# Patient Record
Sex: FEMALE | Race: WHITE | NOT HISPANIC OR LATINO | Employment: FULL TIME | URBAN - METROPOLITAN AREA
[De-identification: names, ages, dates, MRNs, and addresses within clinical notes are randomized per-mention and may not be internally consistent; named-entity substitution may affect disease eponyms.]

---

## 2019-03-18 ENCOUNTER — HOSPITAL ENCOUNTER (OUTPATIENT)
Dept: RADIOLOGY | Facility: MEDICAL CENTER | Age: 29
End: 2019-03-18
Attending: CHIROPRACTOR
Payer: COMMERCIAL

## 2019-03-18 DIAGNOSIS — M99.04 SOMATIC DYSFUNCTION OF SACRAL REGION: ICD-10-CM

## 2019-03-18 DIAGNOSIS — M99.03 SOMATIC DYSFUNCTION OF LUMBAR REGION: ICD-10-CM

## 2019-03-18 DIAGNOSIS — M99.02 THORACIC SEGMENT DYSFUNCTION: ICD-10-CM

## 2019-03-18 DIAGNOSIS — M25.511 ACUTE PAIN OF RIGHT SHOULDER: ICD-10-CM

## 2019-03-18 DIAGNOSIS — M25.512 ACUTE PAIN OF LEFT SHOULDER: ICD-10-CM

## 2019-03-18 DIAGNOSIS — M99.01 CERVICAL SEGMENT DYSFUNCTION: ICD-10-CM

## 2019-03-18 PROCEDURE — 72100 X-RAY EXAM L-S SPINE 2/3 VWS: CPT

## 2019-03-18 PROCEDURE — 73030 X-RAY EXAM OF SHOULDER: CPT | Mod: RT

## 2019-03-18 PROCEDURE — 72040 X-RAY EXAM NECK SPINE 2-3 VW: CPT

## 2019-03-18 PROCEDURE — 72170 X-RAY EXAM OF PELVIS: CPT

## 2019-03-18 PROCEDURE — 72072 X-RAY EXAM THORAC SPINE 3VWS: CPT

## 2022-09-10 ENCOUNTER — HOSPITAL ENCOUNTER (OUTPATIENT)
Dept: LAB | Facility: MEDICAL CENTER | Age: 32
End: 2022-09-10
Attending: MIDWIFE
Payer: COMMERCIAL

## 2022-09-10 LAB — B-HCG SERPL-ACNC: ABNORMAL MIU/ML (ref 0–5)

## 2022-09-10 PROCEDURE — 36415 COLL VENOUS BLD VENIPUNCTURE: CPT

## 2022-09-10 PROCEDURE — 84702 CHORIONIC GONADOTROPIN TEST: CPT

## 2023-02-24 ENCOUNTER — HOSPITAL ENCOUNTER (OUTPATIENT)
Dept: LAB | Facility: MEDICAL CENTER | Age: 33
End: 2023-02-24
Attending: MIDWIFE
Payer: COMMERCIAL

## 2023-02-24 LAB
25(OH)D3 SERPL-MCNC: 74 NG/ML (ref 30–100)
GLUCOSE 1H P CHAL SERPL-MCNC: 171 MG/DL (ref 65–199)
GLUCOSE 2H P CHAL SERPL-MCNC: 128 MG/DL (ref 65–139)
GLUCOSE 3H P CHAL SERPL-MCNC: 94 MG/DL (ref 65–109)
GLUCOSE BS SERPL-MCNC: 76 MG/DL (ref 65–99)
T4 FREE SERPL-MCNC: 0.97 NG/DL (ref 0.93–1.7)
TSH SERPL DL<=0.005 MIU/L-ACNC: 2.25 UIU/ML (ref 0.38–5.33)

## 2023-02-24 PROCEDURE — 82306 VITAMIN D 25 HYDROXY: CPT

## 2023-02-24 PROCEDURE — 82952 GTT-ADDED SAMPLES: CPT

## 2023-02-24 PROCEDURE — 84439 ASSAY OF FREE THYROXINE: CPT

## 2023-02-24 PROCEDURE — 84443 ASSAY THYROID STIM HORMONE: CPT

## 2023-02-24 PROCEDURE — 36415 COLL VENOUS BLD VENIPUNCTURE: CPT

## 2023-02-24 PROCEDURE — 82951 GLUCOSE TOLERANCE TEST (GTT): CPT

## 2023-05-13 ENCOUNTER — HOSPITAL ENCOUNTER (INPATIENT)
Facility: MEDICAL CENTER | Age: 33
LOS: 2 days | End: 2023-05-15
Attending: OBSTETRICS & GYNECOLOGY | Admitting: OBSTETRICS & GYNECOLOGY
Payer: COMMERCIAL

## 2023-05-13 ENCOUNTER — ANESTHESIA EVENT (OUTPATIENT)
Dept: OBGYN | Facility: MEDICAL CENTER | Age: 33
End: 2023-05-13
Payer: COMMERCIAL

## 2023-05-13 ENCOUNTER — ANESTHESIA (OUTPATIENT)
Dept: OBGYN | Facility: MEDICAL CENTER | Age: 33
End: 2023-05-13
Payer: COMMERCIAL

## 2023-05-13 LAB
ABO + RH BLD: NORMAL
ABO GROUP BLD: NORMAL
BASOPHILS # BLD AUTO: 0.3 % (ref 0–1.8)
BASOPHILS # BLD: 0.07 K/UL (ref 0–0.12)
BLD GP AB SCN SERPL QL: NORMAL
EOSINOPHIL # BLD AUTO: 0.01 K/UL (ref 0–0.51)
EOSINOPHIL NFR BLD: 0 % (ref 0–6.9)
ERYTHROCYTE [DISTWIDTH] IN BLOOD BY AUTOMATED COUNT: 47.1 FL (ref 35.9–50)
HCT VFR BLD AUTO: 39.4 % (ref 37–47)
HGB BLD-MCNC: 14.2 G/DL (ref 12–16)
IMM GRANULOCYTES # BLD AUTO: 0.15 K/UL (ref 0–0.11)
IMM GRANULOCYTES NFR BLD AUTO: 0.6 % (ref 0–0.9)
LYMPHOCYTES # BLD AUTO: 1.55 K/UL (ref 1–4.8)
LYMPHOCYTES NFR BLD: 6.7 % (ref 22–41)
MCH RBC QN AUTO: 34.4 PG (ref 27–33)
MCHC RBC AUTO-ENTMCNC: 36 G/DL (ref 33.6–35)
MCV RBC AUTO: 95.4 FL (ref 81.4–97.8)
MONOCYTES # BLD AUTO: 1.04 K/UL (ref 0–0.85)
MONOCYTES NFR BLD AUTO: 4.5 % (ref 0–13.4)
NEUTROPHILS # BLD AUTO: 20.42 K/UL (ref 2–7.15)
NEUTROPHILS NFR BLD: 87.9 % (ref 44–72)
NRBC # BLD AUTO: 0 K/UL
NRBC BLD-RTO: 0 /100 WBC
PLATELET # BLD AUTO: 159 K/UL (ref 164–446)
PMV BLD AUTO: 10.5 FL (ref 9–12.9)
RBC # BLD AUTO: 4.13 M/UL (ref 4.2–5.4)
RH BLD: NORMAL
T PALLIDUM AB SER QL IA: NORMAL
WBC # BLD AUTO: 23.2 K/UL (ref 4.8–10.8)

## 2023-05-13 PROCEDURE — 700105 HCHG RX REV CODE 258: Performed by: NURSE PRACTITIONER

## 2023-05-13 PROCEDURE — 770002 HCHG ROOM/CARE - OB PRIVATE (112)

## 2023-05-13 PROCEDURE — 700111 HCHG RX REV CODE 636 W/ 250 OVERRIDE (IP)

## 2023-05-13 PROCEDURE — 700105 HCHG RX REV CODE 258: Performed by: ANESTHESIOLOGY

## 2023-05-13 PROCEDURE — 36415 COLL VENOUS BLD VENIPUNCTURE: CPT

## 2023-05-13 PROCEDURE — 86900 BLOOD TYPING SEROLOGIC ABO: CPT

## 2023-05-13 PROCEDURE — 700111 HCHG RX REV CODE 636 W/ 250 OVERRIDE (IP): Performed by: NURSE PRACTITIONER

## 2023-05-13 PROCEDURE — 700105 HCHG RX REV CODE 258: Performed by: OBSTETRICS & GYNECOLOGY

## 2023-05-13 PROCEDURE — 59514 CESAREAN DELIVERY ONLY: CPT | Performed by: OBSTETRICS & GYNECOLOGY

## 2023-05-13 PROCEDURE — 700111 HCHG RX REV CODE 636 W/ 250 OVERRIDE (IP): Performed by: OBSTETRICS & GYNECOLOGY

## 2023-05-13 PROCEDURE — 160002 HCHG RECOVERY MINUTES (STAT): Performed by: OBSTETRICS & GYNECOLOGY

## 2023-05-13 PROCEDURE — 160046 HCHG PACU - 1ST 60 MINS PHASE II: Performed by: OBSTETRICS & GYNECOLOGY

## 2023-05-13 PROCEDURE — 99140 ANES COMP EMERGENCY COND: CPT | Performed by: ANESTHESIOLOGY

## 2023-05-13 PROCEDURE — 700101 HCHG RX REV CODE 250: Performed by: ANESTHESIOLOGY

## 2023-05-13 PROCEDURE — 160047 HCHG PACU  - EA ADDL 30 MINS PHASE II: Performed by: OBSTETRICS & GYNECOLOGY

## 2023-05-13 PROCEDURE — 160035 HCHG PACU - 1ST 60 MINS PHASE I: Performed by: OBSTETRICS & GYNECOLOGY

## 2023-05-13 PROCEDURE — 160041 HCHG SURGERY MINUTES - EA ADDL 1 MIN LEVEL 4: Performed by: OBSTETRICS & GYNECOLOGY

## 2023-05-13 PROCEDURE — 59514 CESAREAN DELIVERY ONLY: CPT | Mod: AS | Performed by: NURSE PRACTITIONER

## 2023-05-13 PROCEDURE — 85025 COMPLETE CBC W/AUTO DIFF WBC: CPT

## 2023-05-13 PROCEDURE — A9270 NON-COVERED ITEM OR SERVICE: HCPCS

## 2023-05-13 PROCEDURE — 88307 TISSUE EXAM BY PATHOLOGIST: CPT

## 2023-05-13 PROCEDURE — 700111 HCHG RX REV CODE 636 W/ 250 OVERRIDE (IP): Performed by: STUDENT IN AN ORGANIZED HEALTH CARE EDUCATION/TRAINING PROGRAM

## 2023-05-13 PROCEDURE — 700102 HCHG RX REV CODE 250 W/ 637 OVERRIDE(OP)

## 2023-05-13 PROCEDURE — 86901 BLOOD TYPING SEROLOGIC RH(D): CPT

## 2023-05-13 PROCEDURE — 160009 HCHG ANES TIME/MIN: Performed by: OBSTETRICS & GYNECOLOGY

## 2023-05-13 PROCEDURE — C1755 CATHETER, INTRASPINAL: HCPCS | Performed by: OBSTETRICS & GYNECOLOGY

## 2023-05-13 PROCEDURE — A9270 NON-COVERED ITEM OR SERVICE: HCPCS | Performed by: OBSTETRICS & GYNECOLOGY

## 2023-05-13 PROCEDURE — 01961 ANES CESAREAN DELIVERY ONLY: CPT | Performed by: ANESTHESIOLOGY

## 2023-05-13 PROCEDURE — 86780 TREPONEMA PALLIDUM: CPT

## 2023-05-13 PROCEDURE — 86850 RBC ANTIBODY SCREEN: CPT

## 2023-05-13 PROCEDURE — 160025 RECOVERY II MINUTES (STATS): Performed by: OBSTETRICS & GYNECOLOGY

## 2023-05-13 PROCEDURE — 700111 HCHG RX REV CODE 636 W/ 250 OVERRIDE (IP): Performed by: ANESTHESIOLOGY

## 2023-05-13 PROCEDURE — 160048 HCHG OR STATISTICAL LEVEL 1-5: Performed by: OBSTETRICS & GYNECOLOGY

## 2023-05-13 PROCEDURE — 160029 HCHG SURGERY MINUTES - 1ST 30 MINS LEVEL 4: Performed by: OBSTETRICS & GYNECOLOGY

## 2023-05-13 PROCEDURE — 700102 HCHG RX REV CODE 250 W/ 637 OVERRIDE(OP): Performed by: OBSTETRICS & GYNECOLOGY

## 2023-05-13 RX ORDER — SODIUM CHLORIDE, SODIUM LACTATE, POTASSIUM CHLORIDE, CALCIUM CHLORIDE 600; 310; 30; 20 MG/100ML; MG/100ML; MG/100ML; MG/100ML
INJECTION, SOLUTION INTRAVENOUS CONTINUOUS
Status: DISCONTINUED | OUTPATIENT
Start: 2023-05-13 | End: 2023-05-13

## 2023-05-13 RX ORDER — IBUPROFEN 800 MG/1
800 TABLET ORAL EVERY 8 HOURS PRN
Status: DISCONTINUED | OUTPATIENT
Start: 2023-05-17 | End: 2023-05-15 | Stop reason: HOSPADM

## 2023-05-13 RX ORDER — IBUPROFEN 800 MG/1
800 TABLET ORAL EVERY 8 HOURS
Status: DISCONTINUED | OUTPATIENT
Start: 2023-05-14 | End: 2023-05-15 | Stop reason: HOSPADM

## 2023-05-13 RX ORDER — SODIUM CHLORIDE, SODIUM LACTATE, POTASSIUM CHLORIDE, CALCIUM CHLORIDE 600; 310; 30; 20 MG/100ML; MG/100ML; MG/100ML; MG/100ML
INJECTION, SOLUTION INTRAVENOUS ONCE
Status: ACTIVE | OUTPATIENT
Start: 2023-05-13 | End: 2023-05-14

## 2023-05-13 RX ORDER — ACETAMINOPHEN 500 MG
1000 TABLET ORAL EVERY 6 HOURS PRN
Status: DISCONTINUED | OUTPATIENT
Start: 2023-05-16 | End: 2023-05-15 | Stop reason: HOSPADM

## 2023-05-13 RX ORDER — OXYCODONE HYDROCHLORIDE 5 MG/1
5 TABLET ORAL EVERY 4 HOURS PRN
Status: DISCONTINUED | OUTPATIENT
Start: 2023-05-13 | End: 2023-05-15 | Stop reason: HOSPADM

## 2023-05-13 RX ORDER — SODIUM CHLORIDE, SODIUM LACTATE, POTASSIUM CHLORIDE, CALCIUM CHLORIDE 600; 310; 30; 20 MG/100ML; MG/100ML; MG/100ML; MG/100ML
INJECTION, SOLUTION INTRAVENOUS
Status: DISCONTINUED | OUTPATIENT
Start: 2023-05-13 | End: 2023-05-13 | Stop reason: SURG

## 2023-05-13 RX ORDER — AZITHROMYCIN 500 MG/5ML
500 INJECTION, POWDER, LYOPHILIZED, FOR SOLUTION INTRAVENOUS ONCE
Status: COMPLETED | OUTPATIENT
Start: 2023-05-13 | End: 2023-05-13

## 2023-05-13 RX ORDER — DIPHENHYDRAMINE HYDROCHLORIDE 50 MG/ML
12.5 INJECTION INTRAMUSCULAR; INTRAVENOUS
Status: DISCONTINUED | OUTPATIENT
Start: 2023-05-13 | End: 2023-05-13 | Stop reason: HOSPADM

## 2023-05-13 RX ORDER — SIMETHICONE 125 MG
125 TABLET,CHEWABLE ORAL 4 TIMES DAILY PRN
Status: DISCONTINUED | OUTPATIENT
Start: 2023-05-13 | End: 2023-05-15 | Stop reason: HOSPADM

## 2023-05-13 RX ORDER — KETOROLAC TROMETHAMINE 30 MG/ML
30 INJECTION, SOLUTION INTRAMUSCULAR; INTRAVENOUS ONCE
Status: DISCONTINUED | OUTPATIENT
Start: 2023-05-13 | End: 2023-05-13 | Stop reason: HOSPADM

## 2023-05-13 RX ORDER — SODIUM CHLORIDE, SODIUM LACTATE, POTASSIUM CHLORIDE, CALCIUM CHLORIDE 600; 310; 30; 20 MG/100ML; MG/100ML; MG/100ML; MG/100ML
INJECTION, SOLUTION INTRAVENOUS PRN
Status: DISCONTINUED | OUTPATIENT
Start: 2023-05-13 | End: 2023-05-15 | Stop reason: HOSPADM

## 2023-05-13 RX ORDER — VITAMIN A ACETATE, BETA CAROTENE, ASCORBIC ACID, CHOLECALCIFEROL, .ALPHA.-TOCOPHEROL ACETATE, DL-, THIAMINE MONONITRATE, RIBOFLAVIN, NIACINAMIDE, PYRIDOXINE HYDROCHLORIDE, FOLIC ACID, CYANOCOBALAMIN, CALCIUM CARBONATE, FERROUS FUMARATE, ZINC OXIDE, CUPRIC OXIDE 3080; 12; 120; 400; 1; 1.84; 3; 20; 22; 920; 25; 200; 27; 10; 2 [IU]/1; UG/1; MG/1; [IU]/1; MG/1; MG/1; MG/1; MG/1; MG/1; [IU]/1; MG/1; MG/1; MG/1; MG/1; MG/1
1 TABLET, FILM COATED ORAL
Status: DISCONTINUED | OUTPATIENT
Start: 2023-05-14 | End: 2023-05-15 | Stop reason: HOSPADM

## 2023-05-13 RX ORDER — CITRIC ACID/SODIUM CITRATE 334-500MG
30 SOLUTION, ORAL ORAL ONCE
Status: COMPLETED | OUTPATIENT
Start: 2023-05-13 | End: 2023-05-13

## 2023-05-13 RX ORDER — METHYLERGONOVINE MALEATE 0.2 MG/ML
INJECTION INTRAVENOUS PRN
Status: DISCONTINUED | OUTPATIENT
Start: 2023-05-13 | End: 2023-05-13 | Stop reason: SURG

## 2023-05-13 RX ORDER — METHYLERGONOVINE MALEATE 0.2 MG/ML
0.2 INJECTION INTRAVENOUS
Status: DISCONTINUED | OUTPATIENT
Start: 2023-05-13 | End: 2023-05-13 | Stop reason: HOSPADM

## 2023-05-13 RX ORDER — MEPERIDINE HYDROCHLORIDE 25 MG/ML
INJECTION INTRAMUSCULAR; INTRAVENOUS; SUBCUTANEOUS PRN
Status: DISCONTINUED | OUTPATIENT
Start: 2023-05-13 | End: 2023-05-13 | Stop reason: SURG

## 2023-05-13 RX ORDER — MISOPROSTOL 200 UG/1
800 TABLET ORAL
Status: DISCONTINUED | OUTPATIENT
Start: 2023-05-13 | End: 2023-05-13

## 2023-05-13 RX ORDER — ONDANSETRON 4 MG/1
4 TABLET, ORALLY DISINTEGRATING ORAL EVERY 6 HOURS PRN
Status: DISCONTINUED | OUTPATIENT
Start: 2023-05-13 | End: 2023-05-13

## 2023-05-13 RX ORDER — CEFAZOLIN SODIUM 1 G/3ML
2 INJECTION, POWDER, FOR SOLUTION INTRAMUSCULAR; INTRAVENOUS ONCE
Status: COMPLETED | OUTPATIENT
Start: 2023-05-13 | End: 2023-05-13

## 2023-05-13 RX ORDER — DIPHENHYDRAMINE HYDROCHLORIDE 50 MG/ML
25 INJECTION INTRAMUSCULAR; INTRAVENOUS EVERY 6 HOURS PRN
Status: DISCONTINUED | OUTPATIENT
Start: 2023-05-13 | End: 2023-05-15 | Stop reason: HOSPADM

## 2023-05-13 RX ORDER — PHENYLEPHRINE HYDROCHLORIDE 10 MG/ML
INJECTION, SOLUTION INTRAMUSCULAR; INTRAVENOUS; SUBCUTANEOUS PRN
Status: DISCONTINUED | OUTPATIENT
Start: 2023-05-13 | End: 2023-05-13 | Stop reason: SURG

## 2023-05-13 RX ORDER — ONDANSETRON 2 MG/ML
4 INJECTION INTRAMUSCULAR; INTRAVENOUS EVERY 6 HOURS PRN
Status: DISCONTINUED | OUTPATIENT
Start: 2023-05-13 | End: 2023-05-15 | Stop reason: HOSPADM

## 2023-05-13 RX ORDER — LIDOCAINE HYDROCHLORIDE 10 MG/ML
20 INJECTION, SOLUTION INFILTRATION; PERINEURAL
Status: DISCONTINUED | OUTPATIENT
Start: 2023-05-13 | End: 2023-05-13 | Stop reason: HOSPADM

## 2023-05-13 RX ORDER — KETOROLAC TROMETHAMINE 30 MG/ML
INJECTION, SOLUTION INTRAMUSCULAR; INTRAVENOUS
Status: ACTIVE
Start: 2023-05-13 | End: 2023-05-14

## 2023-05-13 RX ORDER — ALUMINA, MAGNESIA, AND SIMETHICONE 2400; 2400; 240 MG/30ML; MG/30ML; MG/30ML
30 SUSPENSION ORAL EVERY 6 HOURS PRN
Status: DISCONTINUED | OUTPATIENT
Start: 2023-05-13 | End: 2023-05-13 | Stop reason: HOSPADM

## 2023-05-13 RX ORDER — OXYTOCIN 10 [USP'U]/ML
10 INJECTION, SOLUTION INTRAMUSCULAR; INTRAVENOUS
Status: DISCONTINUED | OUTPATIENT
Start: 2023-05-13 | End: 2023-05-13

## 2023-05-13 RX ORDER — MORPHINE SULFATE 0.5 MG/ML
INJECTION, SOLUTION EPIDURAL; INTRATHECAL; INTRAVENOUS
Status: COMPLETED | OUTPATIENT
Start: 2023-05-13 | End: 2023-05-13

## 2023-05-13 RX ORDER — KETOROLAC TROMETHAMINE 30 MG/ML
30 INJECTION, SOLUTION INTRAMUSCULAR; INTRAVENOUS EVERY 8 HOURS PRN
Status: COMPLETED | OUTPATIENT
Start: 2023-05-13 | End: 2023-05-13

## 2023-05-13 RX ORDER — ONDANSETRON 4 MG/1
4 TABLET, ORALLY DISINTEGRATING ORAL EVERY 6 HOURS PRN
Status: DISCONTINUED | OUTPATIENT
Start: 2023-05-13 | End: 2023-05-15 | Stop reason: HOSPADM

## 2023-05-13 RX ORDER — OXYTOCIN 10 [USP'U]/ML
10 INJECTION, SOLUTION INTRAMUSCULAR; INTRAVENOUS
Status: DISCONTINUED | OUTPATIENT
Start: 2023-05-13 | End: 2023-05-13 | Stop reason: HOSPADM

## 2023-05-13 RX ORDER — MISOPROSTOL 200 UG/1
800 TABLET ORAL
Status: DISCONTINUED | OUTPATIENT
Start: 2023-05-13 | End: 2023-05-13 | Stop reason: HOSPADM

## 2023-05-13 RX ORDER — DIPHENHYDRAMINE HCL 25 MG
25 TABLET ORAL EVERY 6 HOURS PRN
Status: DISCONTINUED | OUTPATIENT
Start: 2023-05-13 | End: 2023-05-15 | Stop reason: HOSPADM

## 2023-05-13 RX ORDER — DOCUSATE SODIUM 100 MG/1
100 CAPSULE, LIQUID FILLED ORAL 2 TIMES DAILY PRN
Status: DISCONTINUED | OUTPATIENT
Start: 2023-05-13 | End: 2023-05-15 | Stop reason: HOSPADM

## 2023-05-13 RX ORDER — OXYCODONE HYDROCHLORIDE 10 MG/1
10 TABLET ORAL EVERY 4 HOURS PRN
Status: DISCONTINUED | OUTPATIENT
Start: 2023-05-13 | End: 2023-05-15 | Stop reason: HOSPADM

## 2023-05-13 RX ORDER — METHYLERGONOVINE MALEATE 0.2 MG/ML
INJECTION INTRAVENOUS
Status: COMPLETED
Start: 2023-05-13 | End: 2023-05-13

## 2023-05-13 RX ORDER — METOCLOPRAMIDE HYDROCHLORIDE 5 MG/ML
10 INJECTION INTRAMUSCULAR; INTRAVENOUS ONCE
Status: COMPLETED | OUTPATIENT
Start: 2023-05-13 | End: 2023-05-13

## 2023-05-13 RX ORDER — CALCIUM CARBONATE 500 MG/1
1000 TABLET, CHEWABLE ORAL EVERY 6 HOURS PRN
Status: DISCONTINUED | OUTPATIENT
Start: 2023-05-13 | End: 2023-05-15 | Stop reason: HOSPADM

## 2023-05-13 RX ORDER — ONDANSETRON 2 MG/ML
4 INJECTION INTRAMUSCULAR; INTRAVENOUS
Status: DISCONTINUED | OUTPATIENT
Start: 2023-05-13 | End: 2023-05-13 | Stop reason: HOSPADM

## 2023-05-13 RX ORDER — ACETAMINOPHEN 500 MG
1000 TABLET ORAL EVERY 6 HOURS
Status: DISCONTINUED | OUTPATIENT
Start: 2023-05-13 | End: 2023-05-15 | Stop reason: HOSPADM

## 2023-05-13 RX ORDER — LOPERAMIDE HYDROCHLORIDE 2 MG/1
2 CAPSULE ORAL 4 TIMES DAILY PRN
Status: DISCONTINUED | OUTPATIENT
Start: 2023-05-13 | End: 2023-05-13 | Stop reason: HOSPADM

## 2023-05-13 RX ORDER — ONDANSETRON 2 MG/ML
4 INJECTION INTRAMUSCULAR; INTRAVENOUS EVERY 6 HOURS PRN
Status: DISCONTINUED | OUTPATIENT
Start: 2023-05-13 | End: 2023-05-13

## 2023-05-13 RX ORDER — SODIUM CHLORIDE, SODIUM LACTATE, POTASSIUM CHLORIDE, CALCIUM CHLORIDE 600; 310; 30; 20 MG/100ML; MG/100ML; MG/100ML; MG/100ML
INJECTION, SOLUTION INTRAVENOUS ONCE
Status: DISCONTINUED | OUTPATIENT
Start: 2023-05-13 | End: 2023-05-13

## 2023-05-13 RX ORDER — KETOROLAC TROMETHAMINE 30 MG/ML
INJECTION, SOLUTION INTRAMUSCULAR; INTRAVENOUS
Status: COMPLETED
Start: 2023-05-13 | End: 2023-05-13

## 2023-05-13 RX ORDER — METHYLERGONOVINE MALEATE 0.2 MG/ML
0.2 INJECTION INTRAVENOUS
Status: DISCONTINUED | OUTPATIENT
Start: 2023-05-13 | End: 2023-05-13

## 2023-05-13 RX ORDER — MISOPROSTOL 200 UG/1
TABLET ORAL
Status: COMPLETED
Start: 2023-05-13 | End: 2023-05-13

## 2023-05-13 RX ORDER — OXYTOCIN 10 [USP'U]/ML
10 INJECTION, SOLUTION INTRAMUSCULAR; INTRAVENOUS
Status: DISCONTINUED | OUTPATIENT
Start: 2023-05-13 | End: 2023-05-15 | Stop reason: HOSPADM

## 2023-05-13 RX ORDER — TERBUTALINE SULFATE 1 MG/ML
0.25 INJECTION, SOLUTION SUBCUTANEOUS
Status: DISCONTINUED | OUTPATIENT
Start: 2023-05-13 | End: 2023-05-13 | Stop reason: HOSPADM

## 2023-05-13 RX ORDER — IBUPROFEN 800 MG/1
800 TABLET ORAL
Status: DISCONTINUED | OUTPATIENT
Start: 2023-05-13 | End: 2023-05-13 | Stop reason: HOSPADM

## 2023-05-13 RX ORDER — ACETAMINOPHEN 500 MG
1000 TABLET ORAL
Status: DISCONTINUED | OUTPATIENT
Start: 2023-05-13 | End: 2023-05-13 | Stop reason: HOSPADM

## 2023-05-13 RX ORDER — HALOPERIDOL 5 MG/ML
1 INJECTION INTRAMUSCULAR
Status: DISCONTINUED | OUTPATIENT
Start: 2023-05-13 | End: 2023-05-13 | Stop reason: HOSPADM

## 2023-05-13 RX ORDER — BUPIVACAINE HYDROCHLORIDE 7.5 MG/ML
INJECTION, SOLUTION INTRASPINAL
Status: COMPLETED | OUTPATIENT
Start: 2023-05-13 | End: 2023-05-13

## 2023-05-13 RX ORDER — SODIUM CHLORIDE, SODIUM GLUCONATE, SODIUM ACETATE, POTASSIUM CHLORIDE AND MAGNESIUM CHLORIDE 526; 502; 368; 37; 30 MG/100ML; MG/100ML; MG/100ML; MG/100ML; MG/100ML
1500 INJECTION, SOLUTION INTRAVENOUS ONCE
Status: COMPLETED | OUTPATIENT
Start: 2023-05-13 | End: 2023-05-13

## 2023-05-13 RX ORDER — CARBOPROST TROMETHAMINE 250 UG/ML
250 INJECTION, SOLUTION INTRAMUSCULAR
Status: DISCONTINUED | OUTPATIENT
Start: 2023-05-13 | End: 2023-05-13

## 2023-05-13 RX ADMIN — MEPERIDINE HYDROCHLORIDE 25 MG: 25 INJECTION INTRAMUSCULAR; INTRAVENOUS; SUBCUTANEOUS at 04:42

## 2023-05-13 RX ADMIN — SODIUM CHLORIDE, SODIUM GLUCONATE, SODIUM ACETATE, POTASSIUM CHLORIDE AND MAGNESIUM CHLORIDE 1500 ML: 526; 502; 368; 37; 30 INJECTION, SOLUTION INTRAVENOUS at 03:45

## 2023-05-13 RX ADMIN — ACETAMINOPHEN 1000 MG: 500 TABLET, FILM COATED ORAL at 17:12

## 2023-05-13 RX ADMIN — METHYLERGONOVINE MALEATE 200 MCG: 0.2 INJECTION, SOLUTION INTRAMUSCULAR; INTRAVENOUS at 04:32

## 2023-05-13 RX ADMIN — ACETAMINOPHEN 1000 MG: 500 TABLET, FILM COATED ORAL at 09:07

## 2023-05-13 RX ADMIN — DOCUSATE SODIUM 100 MG: 100 CAPSULE, LIQUID FILLED ORAL at 14:00

## 2023-05-13 RX ADMIN — SODIUM CHLORIDE, POTASSIUM CHLORIDE, SODIUM LACTATE AND CALCIUM CHLORIDE: 600; 310; 30; 20 INJECTION, SOLUTION INTRAVENOUS at 03:04

## 2023-05-13 RX ADMIN — KETOROLAC TROMETHAMINE 30 MG: 30 INJECTION, SOLUTION INTRAMUSCULAR; INTRAVENOUS at 22:54

## 2023-05-13 RX ADMIN — ACETAMINOPHEN 1000 MG: 500 TABLET, FILM COATED ORAL at 21:23

## 2023-05-13 RX ADMIN — SODIUM CITRATE AND CITRIC ACID MONOHYDRATE 30 ML: 500; 334 SOLUTION ORAL at 03:48

## 2023-05-13 RX ADMIN — CEFAZOLIN 2 G: 1 INJECTION, POWDER, FOR SOLUTION INTRAMUSCULAR; INTRAVENOUS at 04:07

## 2023-05-13 RX ADMIN — PHENYLEPHRINE HYDROCHLORIDE 100 MCG: 10 INJECTION INTRAVENOUS at 04:20

## 2023-05-13 RX ADMIN — OXYTOCIN 1000 ML: 10 INJECTION, SOLUTION INTRAMUSCULAR; INTRAVENOUS at 04:30

## 2023-05-13 RX ADMIN — PHENYLEPHRINE HYDROCHLORIDE 100 MCG: 10 INJECTION INTRAVENOUS at 04:39

## 2023-05-13 RX ADMIN — MISOPROSTOL 800 MCG: 200 TABLET ORAL at 04:45

## 2023-05-13 RX ADMIN — SODIUM CHLORIDE, POTASSIUM CHLORIDE, SODIUM LACTATE AND CALCIUM CHLORIDE: 600; 310; 30; 20 INJECTION, SOLUTION INTRAVENOUS at 03:45

## 2023-05-13 RX ADMIN — PHENYLEPHRINE HYDROCHLORIDE 100 MCG: 10 INJECTION INTRAVENOUS at 04:35

## 2023-05-13 RX ADMIN — FAMOTIDINE 20 MG: 10 INJECTION INTRAVENOUS at 03:48

## 2023-05-13 RX ADMIN — KETOROLAC TROMETHAMINE 30 MG: 30 INJECTION, SOLUTION INTRAMUSCULAR; INTRAVENOUS at 05:43

## 2023-05-13 RX ADMIN — AMPICILLIN SODIUM 1000 MG: 1 INJECTION, POWDER, FOR SOLUTION INTRAMUSCULAR; INTRAVENOUS at 03:06

## 2023-05-13 RX ADMIN — MORPHINE SULFATE 150 MCG: 0.5 INJECTION, SOLUTION EPIDURAL; INTRATHECAL; INTRAVENOUS at 04:05

## 2023-05-13 RX ADMIN — OXYTOCIN 125 ML/HR: 10 INJECTION, SOLUTION INTRAMUSCULAR; INTRAVENOUS at 05:18

## 2023-05-13 RX ADMIN — METOCLOPRAMIDE 10 MG: 5 INJECTION, SOLUTION INTRAMUSCULAR; INTRAVENOUS at 03:48

## 2023-05-13 RX ADMIN — SODIUM CHLORIDE, POTASSIUM CHLORIDE, SODIUM LACTATE AND CALCIUM CHLORIDE: 600; 310; 30; 20 INJECTION, SOLUTION INTRAVENOUS at 05:00

## 2023-05-13 RX ADMIN — KETOROLAC TROMETHAMINE 30 MG: 30 INJECTION, SOLUTION INTRAMUSCULAR; INTRAVENOUS at 14:00

## 2023-05-13 RX ADMIN — AZITHROMYCIN FOR INJECTION INJECTION, POWDER, LYOPHILIZED, FOR SOLUTION 500 MG: 500 INJECTION INTRAVENOUS at 03:45

## 2023-05-13 RX ADMIN — BUPIVACAINE HYDROCHLORIDE IN DEXTROSE 1.7 ML: 7.5 INJECTION, SOLUTION SUBARACHNOID at 04:05

## 2023-05-13 ASSESSMENT — PATIENT HEALTH QUESTIONNAIRE - PHQ9
2. FEELING DOWN, DEPRESSED, IRRITABLE, OR HOPELESS: NOT AT ALL
SUM OF ALL RESPONSES TO PHQ9 QUESTIONS 1 AND 2: 0
1. LITTLE INTEREST OR PLEASURE IN DOING THINGS: NOT AT ALL

## 2023-05-13 ASSESSMENT — PAIN DESCRIPTION - PAIN TYPE
TYPE: SURGICAL PAIN

## 2023-05-13 ASSESSMENT — LIFESTYLE VARIABLES: EVER_SMOKED: NEVER

## 2023-05-13 NOTE — ANESTHESIA PROCEDURE NOTES
Spinal Block    Date/Time: 5/13/2023 4:05 AM    Performed by: John Gomez M.D.  Authorized by: John Gomez M.D.    Start Time:  5/13/2023 4:05 AM  Reason for Block: primary anesthetic    patient identified, IV checked, site marked, risks and benefits discussed, surgical consent, monitors and equipment checked, pre-op evaluation and timeout performed    Patient Position:  Sitting  Prep: ChloraPrep, patient draped and sterile technique    Monitoring:  Blood pressure, continuous pulse oximetry and heart rate  Approach:  Midline  Location:  L3-4  Injection Technique:  Single-shot  Skin infiltration:  Lidocaine  Strength:  1%  Dose:  3ml  Needle Type:  Pencan  Needle Gauge:  25 G  CSF flowing pre/post injection:  Yes  Sensory Level:  T6

## 2023-05-13 NOTE — PROGRESS NOTES
OB attending note;    This patient is a 32-year-old female  1 para 0 at 40-5/7 weeks brought in by lay nurse midwife.  The patient has had prolonged rupture of membranes for over 3 days and has been at 8 cm since 11 AM (18 hours) she is GBS positive.  She has received ampicillin currently afebrile.  Fetal heart rate tracing shows minimal to no variability but no decelerations.  Patient arrived at approximately 0245 hrs. I have been counseling and talking to the patient her  and the lay nurse midwife and the patient is finally agree with my suggestion of immediate  section anesthesia has been notified as well as operating room and we are preparing to take her back emergently.  Preoperative counseling performed;  Discussed the risks of surgery to include risk of pain bleeding infection damage to any or all internal organs including but not limited to bowel intestines bladder uterus tubes ovaries nerves blood vessels skin and possible bruising to her baby.  Wound infection uterine infection was also discussed the risk of this is much higher due to prolonged rupture the membranes may be as high as 25 to 50%.  The risk of blood transfusion was also discussed to include risk of AIDS or hepatitis the risk of heavy blood loss during the surgery is at least 10 to 15% with a possible need for blood transfusion and they have agreed to blood transfusion as needed.  We have also discussed hysterectomy to control hemorrhage.  Hysterectomy means the patient will no longer be able to bear children.  All questions are answered in detail patient and her  were present.  They are giving ample opportunity to ask questions.  I answered all the questions to the best of my ability

## 2023-05-13 NOTE — CARE PLAN
The patient is Stable - Low risk of patient condition declining or worsening    Shift Goals  Clinical Goals: Lochia WDL, rest, pain control    Progress made toward(s) clinical / shift goals:    Problem: Knowledge Deficit - Standard  Goal: Patient and family/care givers will demonstrate understanding of plan of care, disease process/condition, diagnostic tests and medications  Outcome: Progressing     Problem: Skin Integrity  Goal: Skin integrity is maintained or improved  Outcome: Progressing     Problem: Knowledge Deficit -   Goal: Patient/support person will demonstrate understanding regarding  section  Outcome: Progressing     Problem: Pain  Goal: Patient's pain will be alleviated or reduced to the patient’s comfort goal  Outcome: Progressing     Problem: Risk for Infection and Impaired Wound Healing  Goal: Patient will remain free from infection  Outcome: Progressing  Goal: Patient's wound/surgical incision will decrease in size and heals properly  Outcome: Progressing     Problem: Risk for Venous Thromboembolism (VTE)  Goal: VTE prevention measures will be implemented and patient will remain free from VTE  Outcome: Progressing       Patient is not progressing towards the following goals:

## 2023-05-13 NOTE — OR SURGEON
Immediate Post OP Note    PreOp Diagnosis: Arrest of dilatation, nonreassuring fetal heart tracing, lady nurse midwife transport      PostOp Diagnosis: Same      Procedure(s):   SECTION, PRIMARY - Wound Class: Contaminated    Surgeon(s):  Perry Hollingsworth M.D.    Anesthesiologist/Type of Anesthesia:  Anesthesiologist: John Gomez M.D./Spinal    Surgical Staff:  Circulator: Idalmis Fritz R.N.  Scrub Person: Idalmis Mota  First Assist: Lise Sosa C.N.M.  Count [place]: Tessie Cassidy R.N.  L&BRAXTON Circulator Assistant: Katiuska Ross R.N.  L&BRAXTON Baby  Nurse: Sarina Villatoro R.N.    Specimens removed if any:  ID Type Source Tests Collected by Time Destination   A : routine histology Tissue Placenta PATHOLOGY SPECIMEN Perry Hollingsworth M.D. 2023  4:49 AM      Specimens-Cord gases, placenta cord and membranes  Estimated Blood Loss: 500 cc     Findings: Apgar scores 6 and 8, cord gas results pending normal pelvis meconium stained amniotic fluid nuchal cord x1 loose    Complications: None        2023 5:10 AM Perry Hollingsworth M.D.

## 2023-05-13 NOTE — ANESTHESIA PREPROCEDURE EVALUATION
Case: [id number] Date/Time: 23 0340    Procedure:  SECTION, PRIMARY (Abdomen)    Anesthesia type: Spinal    Pre-op diagnosis: arrest of dilation    Location: [facility] OR 02 / SURGERY LABOR AND DELIVERY    Surgeons: Perry Hollingsworth M.D.        Failure to progress  Relevant Problems   No relevant active problems       Physical Exam    Airway   Mallampati: II  TM distance: >3 FB  Neck ROM: full       Cardiovascular - normal exam  Rhythm: regular  Rate: normal  (-) murmur     Dental - normal exam           Pulmonary - normal exam  Breath sounds clear to auscultation     Abdominal    Neurological - normal exam                 Anesthesia Plan    ASA 2- EMERGENT   ASA physical status emergent criteria: non-reassuring fetal heart tones    Plan - spinal   Neuraxial block will be primary anesthetic                  Pertinent diagnostic labs and testing reviewed    Informed Consent:    Anesthetic plan and risks discussed with patient.

## 2023-05-13 NOTE — PROGRESS NOTES
0700: Report received from Zina LAIRD RN. Patient resting in [facility]. Infant in chest and FOB at bedside. VSS. Needs met at this time. Questions answered at this time.     0730: Per Jessica LY, OK to remove IV from L AC.     0810: Patient transferred to PP unit via [facility]. Infant in arms, FOB at side. Report to April RN. Bands verified.

## 2023-05-13 NOTE — H&P
"  History and Physical    Cherelle Morton is a 32 y.o. female  -Para:     Gestational Age:  40w5d  Admitted for:   Active Labor and home birth transfer  Admitted to  [facility].  Patient received prenatal care: [facility]    HPI: Patient is admitted with the above mentioned Chief Complaint and States   Loss of fluid:   Positive since 05/10 at 2145  Abdominal Pain:  negative  Uterine Contractions:  positive  Vaginal Bleeding:  negative  Fetal Movement:  normal  Patient denies fever, chills, nausea, vomiting , headache, visual disturbance, or dysuria  No LMP recorded. Patient is pregnant.  Estimated Date of Delivery: 23  Final SARA: 2023, by Patient Reported    Patient Active Problem List    Diagnosis Date Noted    Labor and delivery indication for care or intervention 2023       Admitting DX: Labor and delivery indication for care or intervention [O75.9]   Pregnancy Complications:  none  OB Risk Factors:   group B strep colonizer, prolonged ROM, arrest of dilation  Labor State:    Active phase labor., Prolonged active phase labor., Arrest of progress - first stage labor., and Dysfunctional uterine contractions.    History:   has no past medical history on file.     has no past surgical history on file.    OB History    Para Term  AB Living   1             SAB IAB Ectopic Molar Multiple Live Births                    # Outcome Date GA Lbr Justo/2nd Weight Sex Delivery Anes PTL Lv   1 Current                Medications:  No current facility-administered medications on file prior to encounter.     No current outpatient medications on file prior to encounter.       Allergies:  Patient has no known allergies.    ROS:   Neuro: negative    Cardiovascular: negative  Gastro intestinal: negative  Genitourinary: negative            Physical Exam:  /61   Pulse 98   Temp 36.6 °C (97.9 °F) (Tympanic)   Resp 20   Ht 1.575 m (5' 2\")   Wt 63.5 kg (140 lb)   SpO2 " 96%   BMI 25.61 kg/m²   Constitutional: healthy-appearing, Well-developed, well-nourished, in no acute distress  No JVD: while supine  HEENT: EOMI  Breast Exam: Inspection negative. No nipple discharge or bleeding. No masses or nodularity palpable  Cardio: regular rate and rhythm  Lung: unlabored respirations, no intercostal retractions or accessory muscle use, clear to auscultation without rales or wheezes  Abdomen: abdomen is soft without significant tenderness, masses, organomegaly or guarding  Extremity: extremities, peripheral pulses and reflexes normal, no edema, redness or tenderness in the calves or thighs, Homans sign is negative, no sign of DVT, feet normal, good pulses, normal color, temperature and sensation    Cervical Exam: Deferred  Pelvis: Adequate    Fetal Assessment: Fetal heart variability: moderate, minimal  Fetal Heart Rate decelerations: early  Fetal Heart Rate accelerations: yes  Baseline FHR: 135 per minute  Uterine contractions: irregular, every 4-5 minutes  Estimated Fetal Weight: 2500 - 3000g      Labs:      Prenatal Results       General (Most Recent Result)       Test Value Date Time    ABO       Rh       Antibody screen       HbA1c       Chlamydia by PCR       Gonorrhea by PCR       RPR/Syphilus       HSV 1/2 by PCR (non-serum)       HSV 1/2 (serum)       HSV 1       HSV 2       HPV (16)       HBsAg       HIV-1 HIV-2 Antibodies       Rubella       Tb                 Pap Smear (Most Recent Result)       Test Value Date Time    Pap smear       Pap smear w/HPV       Pap smear w/CTNG       Pap smar w/HPV CTNG       Pap smear (reflex HPV ACUS)       Pap smear (reflex HPV ASCUS w/CTNG)       Pathology gyn specimen                 Urinalysis (Most Recent Result)       Test Value Date Time    Urinalysis       POC urinalysis       Urine drug screen (w/ conf)       Urine culture ([id number])       Urine Protein/Creatinine Ratio                 Urinalysis, Culture if indicated       Test Value  Date Time    Color       Appearance       Specific Gravity       PH       Glucose       Ketones       Protein       Bilirubin       Nitrites       Leukocytes Esterase       Blood       Comment       Culture                 Urine Drug Screen       Test Value Date Time    Amphetamines       Barbiturates       Benzodiazepines       Cocaine       Methadone       Opiates       Oxycodone       Phencylidine       Propoxyphene       Marijuana Metabolite                 1st Trimester       Test Value Date Time    Hgb       Hct       Fasting Glucose Tolerance       GTT, 1 hour       GTT, 2 hours       GTT, 3 hours                 2nd Trimester       Test Value Date Time    Hgb       Hct       AST       ALT       Uric Acid       Fasting Glucose Tolerance       GTT, 1 hour       GTT, 2 hours       GTT, 3 hours                 3rd Trimester       Test Value Date Time    Hgb       Hct       Platelet count       GBS (SAGE BROTH)       Fasting Glucose Tolerance       GTT, 1 hour       GTT, 2 hous  128 mg/dL 23 0643    GTT, 3hours  94 mg/dL 23 0643              Congenital Disease Screening       Test Value Date Time    First Trimester Screen       Quad Screen       BH Electrophoresis       Cystic Fibrosis Carrier Study       SMA       AFP Maternal Serum        AFP Tetra       NIPT                 Legend    ^: Historical                              Assessment:  Gestational Age:  40w5d  Labor State:   Labor, Active  Risk Factors:   group B strep colonizer, prolonged ROM (5/10/2023 at 2145), arrest of dilation  Pregnancy Complications: Elevated 1 hour with normal 3 hour    Patient Active Problem List    Diagnosis Date Noted    Labor and delivery indication for care or intervention 2023       Plan:   Admitted for: Active Labor  Pain management  CBC, UA, and type/screen  routine urinalysis  Antibiotics: Prophylaxis for GBS- Continue ampicillin  IV meds or epidural as appropriate  Declines  or other interventions  at this time  Dr Hollingsworth is the attending today, and has met and discussed the case with the patient.    Lise Sosa C.N.M.

## 2023-05-13 NOTE — ANESTHESIA TIME REPORT
Anesthesia Start and Stop Event Times     Date Time Event    5/13/2023 0352 Ready for Procedure     0359 Anesthesia Start     0507 Anesthesia Stop        Responsible Staff  05/13/23    Name Role Begin End    John Gomez M.D. Anesth 0359 0507        Overtime Reason:  no overtime (within assigned shift)    Comments:

## 2023-05-13 NOTE — LACTATION NOTE
Initial Lactation Consultation:    Met with Cherelle and her new baby boy.  She reports breastfeeding to be going fairly well at this time. She states infant has already latched five or six times since delivery. She is concerned that baby's latch may be shallow, though, as she is beginning to notice some mild nipple tenderness with feeds.    Upon arrival of lactation consultant, infant has been at breast for 15 minutes. Infant still suckling, but beginning to slow. Latch appears shallow, so Cherelle is encouraged to break suction and re-latch. After infant released from breast, feeding cues unable to be elicited. Infant sleepy and disinterested in re-latching. Encouraged Cherelle to call RN/lactation for next feed to assist with achieving a deeper latch.     feeding and voiding/stooling patterns reviewed. Frequent skin-to-skin and cue-based feeding is encouraged; at least 8 feeds per 24 hours. Reviewed the milk making process, inclusive of supply and demand. Discussed signs of deep, asymmetric latch, and the importance of maintaining good latch to avoid pain/nipple damage and maximize milk transfer. Cherelle is to offer both breasts at each feeding, and baby should be allowed to self-limit time at breast. Nipple care reviewed. Discouraged introduction of artificial nipples during first 4 weeks, unless medically indicated.    Feeding plan:   Cue-based breast feeding, as above.    Cherelle is provided with the opportunity to ask questions. These have been answered to her satisfaction. She is encouraged to call RN/lactation for additional breastfeeding assistance, as needed, throughout remainder of hospital stay.       Cherelle plans to follow up with [facility], and has a home lactation visit scheduled for Monday.  [facility] Breastfeeding Resource list provided.

## 2023-05-13 NOTE — PROGRESS NOTES
0210- Pt is a  at 40.5 weeks gestation, presenting to L&D for prolonged at home labor and prolonged rupture of membranes since 5/10 at 2145. Pt has her SO, maximilian and midwife at bedside. Pt has been 8cm per midwife since about 1100 on  and has not progressed in dilation.  EFM/TOCO applied, POC discussed.   0224- ZIA Sosa CNM at bedside, SVE as charted and unchaged.  0252- Dr. Hollingsworth at bedside to discuss  section. Pt requesting time to discuss and decide.    0326- Pt agreeable for  section, Dr. Hollingsworth at bedside to consent pt.    0700- Bedside report given to Giselle SHEPPARD., POC discussed, care relinquished with pt in stable condition. Pt and SO deny any further needs from this RN.

## 2023-05-13 NOTE — ANESTHESIA POSTPROCEDURE EVALUATION
Patient: Cherelle Morton    Procedure Summary     Date: 23 Room / Location: [facility] OR 02 / SURGERY LABOR AND DELIVERY    Anesthesia Start: 359 Anesthesia Stop: 507    Procedure:  SECTION, PRIMARY (Bilateral: Abdomen) Diagnosis: (same, del)    Surgeons: Perry Hollingsworth M.D. Responsible Provider: John Gomez M.D.    Anesthesia Type: spinal ASA Status: 2 - Emergent          Final Anesthesia Type: spinal  Last vitals  BP   Blood Pressure: 106/57    Temp   36.2 °C (97.1 °F)    Pulse   86   Resp   20    SpO2   96 %      Anesthesia Post Evaluation    Patient location during evaluation: PACU  Patient participation: complete - patient participated  Level of consciousness: awake and alert    Airway patency: patent  Anesthetic complications: no  Cardiovascular status: hemodynamically stable  Respiratory status: acceptable  Hydration status: euvolemic    PONV: none          There were no known notable events for this encounter.     Nurse Pain Score: 4 (NPRS)

## 2023-05-13 NOTE — OP REPORT
DATE OF SERVICE:  2023     PREOPERATIVE DIAGNOSES:  Intrauterine pregnancy at 40 and 5/7th weeks with   arrest of dilatation and nonreassuring fetal heart rate tracing.     POSTOPERATIVE DIAGNOSES:  Intrauterine pregnancy at 40 and 5/7th weeks with   arrest of dilatation and nonreassuring fetal heart rate tracing.     SURGEON:  Perry Hollingsworth MD     ASSISTANT:  Lisa Sosa CNM.     PROCEDURE:  Primary low transverse uterine  section.     SPECIMENS:  Cord gases and placenta sent to pathology department.     ESTIMATED BLOOD LOSS:  500 mL.     DRAINS:  Rodríguez catheter.     ANESTHESIA:  Spinal.     ANESTHESIOLOGIST:  John Gomez MD     COMPLICATIONS:  None.     INDICATIONS:  This patient is a 32-year-old female  1, para 0 at 40 and   5/7th weeks.  She is a lay nurse midwife patient that had prolonged rupture   of membranes for 3-1/2 days and she has been 8 cm with no further cervical   change for approximately 18 hours.  She is group B strep positive.  The   patient was transported via [facility] to labor and delivery after a failed home   birth attempt.     FINDINGS:  Apgar score 6 and 8, cephalic presentation, thick meconium-stained   amniotic fluid.  Nuchal cord x1, loose.  Normal pelvis.  OT position.     DESCRIPTION OF PROCEDURE:  After adequately being counseled, the patient was   taken to the operating room and placed in supine position.  Spinal anesthetic   was placed.  Fetal heart tones were taken before and after placement of   spinal.  She was prepped and draped in the usual sterile fashion.  A   Pfannenstiel skin incision made with a scalpel, taken down to the fascia.  The   fascia was incised with a scalpel.  Fascial incision taken laterally on both   sides with Silva scissors.  Rectus fascia dissected off the underlying rectus   muscles both superiorly and inferiorly and rectus muscles were split in the   midline.  Peritoneal cavity was entered sharply by elevating the peritoneal    lining using Metzenbaum scissors.  Hemostats and the Metzenbaum scissors were   used to incise the peritoneal lining.  Incision was taken superiorly and   inferiorly.  Bladder blade placed over the bladder.  Next, a bladder flap was   developed sharply and a bladder blade replaced over the bladder.  Next, a low   transverse uterine incision was made with a scalpel.  The infant was   delivered, bulb suctioned.  Delayed cord clamping was performed.  The infant   was handed off to pediatrics, respiratory therapy and NICU were there to   resuscitate the infant.  Apgar scores 6 and 8.  Cord gas results pending.    Next, the placenta was removed from the uterus.  Uterine cavity was cleansed   with a moist laparotomy sponge.  Uterus was exteriorized and the uterine   incision was closed in 2 layers using 0 Vicryl in a running locked fashion.    Good hemostasis noted.  Uterus returned to abdominal pelvic cavity.  Pelvis   was irrigated and suctioned.  No bleeding was noted.  Excellent hemostasis   achieved in the hysterotomy site.  The peritoneal lining was closed using   running nonlocked stitch of 0 Vicryl.  Rectus muscles were reapproximated in   the midline using interrupted stitch of 0 chromic and the rectus fascia closed   using running nonlocked stitch of 0 Vicryl.  Subcutaneous tissues were   irrigated and suctioned.  Electrocautery used for hemostasis.  Several   subcuticular stitches of 0 Vicryl were used to reapproximate the subcutaneous   tissues.  Skin was closed using surgical staples and a pressure dressing was   applied.  The patient was taken to recovery room in good condition.  No   complications noted.  Instrument counts were correct.  Surgical timeout had   been performed prior to beginning the surgery.  The infant and mother were   taken to recovery room in excellent condition.        ______________________________  MD ASIM BRAN/GROVER    DD:  2023 05:17  DT:  2023  08:10    Job#:  [id number]

## 2023-05-13 NOTE — PROGRESS NOTES
0810-This RN receives report from Giselle SHEPPARD. Patient brought up to post partum via hospital bed.     1430-This RN gets patient up to br. Patient unable to void at this time. Pericare performed. Fresh pad applied. New gown applied. New pad on bed applied. Patient tolerates getting up well.     1910-Report given to post partum RN. Uncomplicated shift. Patient is stable with no needs or concerns at this time.

## 2023-05-14 LAB
ERYTHROCYTE [DISTWIDTH] IN BLOOD BY AUTOMATED COUNT: 50.7 FL (ref 35.9–50)
HCT VFR BLD AUTO: 35.6 % (ref 37–47)
HGB BLD-MCNC: 12.1 G/DL (ref 12–16)
MCH RBC QN AUTO: 34.2 PG (ref 27–33)
MCHC RBC AUTO-ENTMCNC: 34 G/DL (ref 33.6–35)
MCV RBC AUTO: 100.6 FL (ref 81.4–97.8)
PLATELET # BLD AUTO: 148 K/UL (ref 164–446)
PMV BLD AUTO: 10.2 FL (ref 9–12.9)
RBC # BLD AUTO: 3.54 M/UL (ref 4.2–5.4)
WBC # BLD AUTO: 13.4 K/UL (ref 4.8–10.8)

## 2023-05-14 PROCEDURE — A9270 NON-COVERED ITEM OR SERVICE: HCPCS | Performed by: OBSTETRICS & GYNECOLOGY

## 2023-05-14 PROCEDURE — 85027 COMPLETE CBC AUTOMATED: CPT

## 2023-05-14 PROCEDURE — 770002 HCHG ROOM/CARE - OB PRIVATE (112)

## 2023-05-14 PROCEDURE — 36415 COLL VENOUS BLD VENIPUNCTURE: CPT

## 2023-05-14 PROCEDURE — 700102 HCHG RX REV CODE 250 W/ 637 OVERRIDE(OP): Performed by: OBSTETRICS & GYNECOLOGY

## 2023-05-14 RX ADMIN — DOCUSATE SODIUM 100 MG: 100 CAPSULE, LIQUID FILLED ORAL at 20:16

## 2023-05-14 RX ADMIN — SIMETHICONE 125 MG: 125 TABLET, CHEWABLE ORAL at 22:13

## 2023-05-14 RX ADMIN — ACETAMINOPHEN 1000 MG: 500 TABLET, FILM COATED ORAL at 21:01

## 2023-05-14 RX ADMIN — OXYCODONE HYDROCHLORIDE 10 MG: 10 TABLET ORAL at 22:13

## 2023-05-14 RX ADMIN — DOCUSATE SODIUM 100 MG: 100 CAPSULE, LIQUID FILLED ORAL at 06:43

## 2023-05-14 RX ADMIN — IBUPROFEN 800 MG: 800 TABLET, FILM COATED ORAL at 22:13

## 2023-05-14 RX ADMIN — PRENATAL WITH FERROUS FUM AND FOLIC ACID 1 TABLET: 3080; 920; 120; 400; 22; 1.84; 3; 20; 10; 1; 12; 200; 27; 25; 2 TABLET ORAL at 08:45

## 2023-05-14 RX ADMIN — IBUPROFEN 800 MG: 800 TABLET, FILM COATED ORAL at 11:41

## 2023-05-14 RX ADMIN — ACETAMINOPHEN 1000 MG: 500 TABLET, FILM COATED ORAL at 08:45

## 2023-05-14 RX ADMIN — ACETAMINOPHEN 1000 MG: 500 TABLET, FILM COATED ORAL at 14:47

## 2023-05-14 RX ADMIN — ACETAMINOPHEN 1000 MG: 500 TABLET, FILM COATED ORAL at 03:05

## 2023-05-14 ASSESSMENT — EDINBURGH POSTNATAL DEPRESSION SCALE (EPDS)
I HAVE FELT SCARED OR PANICKY FOR NO GOOD REASON: NO, NOT MUCH
THE THOUGHT OF HARMING MYSELF HAS OCCURRED TO ME: NEVER
I HAVE BLAMED MYSELF UNNECESSARILY WHEN THINGS WENT WRONG: YES, SOME OF THE TIME
I HAVE FELT SAD OR MISERABLE: YES, QUITE OFTEN
THINGS HAVE BEEN GETTING ON TOP OF ME: YES, SOMETIMES I HAVEN'T BEEN COPING AS WELL AS USUAL
I HAVE BEEN ANXIOUS OR WORRIED FOR NO GOOD REASON: YES, SOMETIMES
I HAVE BEEN SO UNHAPPY THAT I HAVE HAD DIFFICULTY SLEEPING: NOT VERY OFTEN
I HAVE BEEN SO UNHAPPY THAT I HAVE BEEN CRYING: ONLY OCCASIONALLY
I HAVE BEEN ABLE TO LAUGH AND SEE THE FUNNY SIDE OF THINGS: AS MUCH AS I ALWAYS COULD
I HAVE LOOKED FORWARD WITH ENJOYMENT TO THINGS: AS MUCH AS I EVER DID

## 2023-05-14 ASSESSMENT — PAIN DESCRIPTION - PAIN TYPE
TYPE: SURGICAL PAIN
TYPE: SURGICAL PAIN
TYPE: ACUTE PAIN;SURGICAL PAIN
TYPE: SURGICAL PAIN
TYPE: ACUTE PAIN;SURGICAL PAIN

## 2023-05-14 NOTE — CARE PLAN
Problem: Altered Physiologic Condition  Goal: Patient physiologically stable as evidenced by normal lochia, palpable uterine involution and vitals within normal limits  Outcome: Progressing     Problem: Bowel Elimination - Post Surgical  Goal: Patient will resume regular bowel sounds and function with no discomfort or distention  Outcome: Progressing   The patient is Stable - Low risk of patient condition declining or worsening pt is hemodynamically stable    Shift Goals: pain controlled, ambulation, rest  Clinical Goals: pain management  Patient Goals: pain controlled, able to void without difficulty, ambulation, rest  Family Goals: support    Progress made toward(s) clinical / shift goals:  pt verbalized acceptable level of pain    Patient is not progressing towards the following goals:

## 2023-05-14 NOTE — CARE PLAN
The patient is Stable - Low risk of patient condition declining or worsening    Shift Goals  Clinical Goals: pain management, maintain stable vitals    Problem: Infection - Postpartum  Goal: Postpartum patient will be free of signs and symptoms of infection  Outcome: Progressing  Note: Patient vitals stable. No signs/symptoms infection noted or reported.     Problem: Respiratory/Oxygenation Function Post-Surgical  Goal: Patient will achieve/maintain normal respiratory rate/effort  Outcome: Progressing  Note: Patient vitals stable. No signs or symptoms respiratory distress.

## 2023-05-14 NOTE — PROGRESS NOTES
Obstetrics & Gynecology Post-Delivery Progress Note    Date of Service      32 y.o.  s/p  for arrest of dilatation and nonreassuring fetal heart rate tracing.   Delivery date: 23      Subjective  Pt reports she is feeling well. Pain is 4-5/10 with movement, otherwise well controlled. She is attempting to breast feed.     Pain: Well controlled  Bleeding: lochia minimal  Tolerating PO: yes  Voiding: without difficulty  Ambulating: yes  Passing flatus: Yes  Feeding: breastfeeding well    Objective  24hr VS:  Temp:  [36.2 °C (97.1 °F)-36.8 °C (98.3 °F)] 36.4 °C (97.6 °F)  Pulse:  [73-91] 73  Resp:  [15-18] 18  BP: ()/(57-68) 100/68  SpO2:  [95 %-98 %] 96 %    Physical Exam  Gen: well appearing, no apparent distress, resting comfortably in bed  CV: rrr, no m/r/g  Resp: CTAB, symmetric breath sounds  Abd: soft, nontender, nondistended  Fundus: firm  Incision: Incision site is clean, dry, and intact with staples in place   Ext: symmetric, no peripheral edema, calves nontender    Labs:  Lab Results   Component Value Date/Time    WBC 13.4 (H) 2023 02:47 AM    RBC 3.54 (L) 2023 02:47 AM    HEMOGLOBIN 12.1 2023 02:47 AM    HEMATOCRIT 35.6 (L) 2023 02:47 AM    .6 (H) 2023 02:47 AM    MCH 34.2 (H) 2023 02:47 AM    MCHC 34.0 2023 02:47 AM    MPV 10.2 2023 02:47 AM    NEUTSPOLYS 87.90 (H) 2023 03:07 AM    LYMPHOCYTES 6.70 (L) 2023 03:07 AM    MONOCYTES 4.50 2023 03:07 AM    EOSINOPHILS 0.00 2023 03:07 AM    BASOPHILS 0.30 2023 03:07 AM       No results found for: SODIUM, POTASSIUM, CHLORIDE, CO2, GLUCOSE, BUN, CREATININE, BUNCREATRAT, GLOMRATE      Medications  ibuprofen, 800 mg, Oral, Q8HRS  acetaminophen, 1,000 mg, Oral, Q6HRS  LR, , Intravenous, Once  oxytocin, 20 Units, Intravenous, Once  prenatal plus vitamin, 1 Tablet, Oral, Daily-0800      PRN medications: LR, ibuprofen **FOLLOWED BY** [START ON 2023]  ibuprofen, acetaminophen **FOLLOWED BY** [START ON 2023] acetaminophen, oxyCODONE immediate-release, oxyCODONE immediate release, ondansetron **OR** ondansetron, diphenhydrAMINE **OR** diphenhydrAMINE, docusate sodium, tetanus-dipth-acell pertussis, measles, mumps and rubella vaccine, oxytocin, simethicone, calcium carbonate      Assessment/Plan  Cherelle Morton is a 32 y.o.yo  postpartum day #1  s/p  for arrest of dilatation and nonreassuring fetal heart rate tracing.     - Post care: meeting all goals  - Pain: controlled  - Rh+, Rubella Immune  - Method of Feeding: plans to breastfeed    VTE prophylaxis: none indicated    - Disposition: likely home PPD 2- 3      Cherelle Galvez M.D. PGY-1

## 2023-05-14 NOTE — PROGRESS NOTES
1930 Received awake on bed bonding with baby, Assessment done fundus firm with scant lochia, abdomen soft non distended, wound dressing clean dry and intact, negative for Karen signs, up to bathroom and voiding without difficulty, plan of care discussed, denies pain at this time, Encourage more ambulation, Needs attended.

## 2023-05-14 NOTE — LACTATION NOTE
This note was copied from a baby's chart.  Follow-up LC visit, parents feel breastfeeding is going well overall, mother's nipples are feeling sensitive but she is able to achieve a comfortable latch, reviewed positioning techniques at breast to facilitate deeper latch, see LC flow sheet for more details. Reviewed hunger cues, cluster feeding, milk onset, and nipple care. Plan to continue cue based breastfeeding at least 8 or more times per 24 hours. Parents deny questions/concerns.

## 2023-05-14 NOTE — PROGRESS NOTES
Assumed care. Assessment completed. Fundus firm, lochia light. Abdominal incision with staples intact. No drainage. Plan of Care reviewed. Will call if pain intervention needed.

## 2023-05-15 ENCOUNTER — PHARMACY VISIT (OUTPATIENT)
Dept: PHARMACY | Facility: MEDICAL CENTER | Age: 33
End: 2023-05-15
Payer: COMMERCIAL

## 2023-05-15 VITALS
RESPIRATION RATE: 18 BRPM | SYSTOLIC BLOOD PRESSURE: 109 MMHG | BODY MASS INDEX: 25.76 KG/M2 | TEMPERATURE: 97 F | HEIGHT: 62 IN | HEART RATE: 65 BPM | WEIGHT: 140 LBS | DIASTOLIC BLOOD PRESSURE: 62 MMHG | OXYGEN SATURATION: 97 %

## 2023-05-15 LAB — PATHOLOGY CONSULT NOTE: NORMAL

## 2023-05-15 PROCEDURE — RXMED WILLOW AMBULATORY MEDICATION CHARGE

## 2023-05-15 PROCEDURE — 700102 HCHG RX REV CODE 250 W/ 637 OVERRIDE(OP): Performed by: OBSTETRICS & GYNECOLOGY

## 2023-05-15 PROCEDURE — A9270 NON-COVERED ITEM OR SERVICE: HCPCS | Performed by: OBSTETRICS & GYNECOLOGY

## 2023-05-15 RX ORDER — IBUPROFEN 800 MG/1
800 TABLET ORAL EVERY 8 HOURS PRN
Qty: 30 TABLET | Refills: 0 | Status: SHIPPED | OUTPATIENT
Start: 2023-05-17

## 2023-05-15 RX ORDER — ACETAMINOPHEN 500 MG
1000 TABLET ORAL EVERY 6 HOURS
Qty: 30 TABLET | Refills: 0 | Status: SHIPPED | OUTPATIENT
Start: 2023-05-15

## 2023-05-15 RX ORDER — HYDROCODONE BITARTRATE AND ACETAMINOPHEN 5; 325 MG/1; MG/1
1 TABLET ORAL EVERY 4 HOURS PRN
Qty: 20 TABLET | Refills: 0 | Status: SHIPPED | OUTPATIENT
Start: 2023-05-15 | End: 2023-05-22

## 2023-05-15 RX ORDER — OXYCODONE HYDROCHLORIDE 5 MG/1
5 TABLET ORAL EVERY 4 HOURS PRN
Qty: 20 TABLET | Refills: 0 | Status: SHIPPED | OUTPATIENT
Start: 2023-05-15 | End: 2023-05-15

## 2023-05-15 RX ORDER — PSEUDOEPHEDRINE HCL 30 MG
100 TABLET ORAL 2 TIMES DAILY PRN
Qty: 60 CAPSULE | Refills: 0 | Status: SHIPPED | OUTPATIENT
Start: 2023-05-15

## 2023-05-15 RX ADMIN — ACETAMINOPHEN 1000 MG: 500 TABLET, FILM COATED ORAL at 09:08

## 2023-05-15 RX ADMIN — DOCUSATE SODIUM 100 MG: 100 CAPSULE, LIQUID FILLED ORAL at 08:03

## 2023-05-15 RX ADMIN — PRENATAL WITH FERROUS FUM AND FOLIC ACID 1 TABLET: 3080; 920; 120; 400; 22; 1.84; 3; 20; 10; 1; 12; 200; 27; 25; 2 TABLET ORAL at 08:03

## 2023-05-15 RX ADMIN — ACETAMINOPHEN 1000 MG: 500 TABLET, FILM COATED ORAL at 02:49

## 2023-05-15 RX ADMIN — IBUPROFEN 800 MG: 800 TABLET, FILM COATED ORAL at 08:02

## 2023-05-15 RX ADMIN — IBUPROFEN 800 MG: 800 TABLET, FILM COATED ORAL at 13:47

## 2023-05-15 ASSESSMENT — PAIN DESCRIPTION - PAIN TYPE
TYPE: SURGICAL PAIN

## 2023-05-15 NOTE — DISCHARGE PLANNING
Discharge Planning Assessment Post Partum    Reason for Referral: Hx of THC use prior to pregnancy  Address: [address] [place] [address] [place] [address]  Type of Living Situation: POB stated they lived together  Mom Diagnosis: Post partum  Baby Diagnosis: 40w5d  Primary Language: English    Name of Baby: Daniel Strauss  Father of the Baby: Mason Strauss  Involved in baby’s care? Yes  Contact Information: [phone]    Prenatal Care: Yes  Mom's PCP: None  PCP for new baby: Dr. Jonha Villalpando    Support System: Yes  Coping/Bonding between mother & baby: Yes  Source of Feeding: Breast, has a pump  Supplies for Infant: MOB states they have supplies    Mom's Insurance: [employer]  Baby Covered on Insurance:Yes  Mother Employed/School:   Other children in the home/names & ages: First baby    Financial Hardship/Income: None identified  Mom's Mental status: Stable and Alert  Services used prior to admit: None    CPS History: None  Psychiatric History: None identified  Domestic Violence History: None   Drug/ETOH History: Hx of THC use prior to pregnancy      Resources Provided: Post partum depression list was given  Referrals Made: None     Clearance for Discharge: Baby is ready for discharge when MOB and FOB are medically cleared

## 2023-05-15 NOTE — PROGRESS NOTES
Assessment done. Vital signs stable. Patient voiding without difficulty, claims to be passing flatus. Fundus firm at umbilicus. open to air over low abdominal staples clean, dry, and intact.  Patient ambulating with steady gait. Patient claims to have good pain relief with p.o meds. Breast feeding infant on demand. Family at bedside. Patient claims she will call for medications or any needs. Will continue to monitor.

## 2023-05-15 NOTE — LACTATION NOTE
This note was copied from a baby's chart.  Follow-up visit, mother's breasts beginning to fill with milk, reports some challenges getting a comfortable latch with almaguer breasts this morning. Discussed techniques to soften areola prior to latch including hand expressing and reverse pressure softening. Reviewed milk onset and prevention of engorgement. Encouraged to call for next feeding for RN and/or LC assist and assessment. Mother considering discharge home later today, planning to have a home visit from her outpatient lactation consultant tomorrow for follow-up support. Plan to continue cue based breastfeeding at least 8 or more times per 24 hours. Mother denies questions/concerns.

## 2023-05-15 NOTE — DISCHARGE SUMMARY
Discharge Summary:      Cherelle Morton    Admit Date:   2023  Discharge Date:  5/15/2023     Admitting diagnosis:  Labor and delivery indication for care or intervention [O75.9]  Labor and delivery, indication for care [O75.9]  Discharge Diagnosis: Status post  for first stage arrest, prolonged ROM.  Pregnancy Complications: none  Tubal Ligation:  no        History:  History reviewed. No pertinent past medical history.  OB History    Para Term  AB Living   1 1 1     1   SAB IAB Ectopic Molar Multiple Live Births           0 1      # Outcome Date GA Lbr Justo/2nd Weight Sex Delivery Anes PTL Lv   1 Term 23 40w5d  3.23 kg (7 lb 1.9 oz) M CS-LTranv Spinal N JAYCEE      Complications: Dysfunctional Labor, Prolonged rupture of membranes        Patient has no known allergies.  Patient Active Problem List    Diagnosis Date Noted    Postpartum care and examination immediately after delivery 05/15/2023    Labor and delivery indication for care or intervention 2023    Labor and delivery, indication for care 2023        Hospital Course:   32 y.o. , now para 1, was admitted with the above mentioned diagnosis, underwent Primary  first stage arrest, prolonged ROM . Patient postpartum course was unremarkable, with progressive advancement in diet , ambulation and toleration of oral analgesia. Patient without complaints today and desires discharge.  She reports she may desire to stay another night depending on how the morning goes, but will let the RN staff know if d/c order needs to be cancelled. Aware she will need to follow up in the office for staple removal on Friday if she leaves today, we could removal staples on POD#3 if she stays overnight.     Vitals:    23 2200 23 0200 23 0600 23 1738   BP: 98/64 100/68 101/61 102/58   Pulse: 74 73 63 75   Resp: 18 18 17 17   Temp: 36.8 °C (98.3 °F) 36.4 °C (97.6 °F) 36.4 °C (97.5 °F) 37 °C (98.6 °F)    TempSrc: Temporal Temporal Temporal Temporal   SpO2: 96% 96% 96% 98%   Weight:       Height:           Current Facility-Administered Medications   Medication Dose    lactated ringers infusion      ibuprofen (MOTRIN) tablet 800 mg  800 mg    Followed by    [START ON 5/17/2023] ibuprofen (MOTRIN) tablet 800 mg  800 mg    acetaminophen (TYLENOL) tablet 1,000 mg  1,000 mg    Followed by    [START ON 5/16/2023] acetaminophen (TYLENOL) tablet 1,000 mg  1,000 mg    oxyCODONE immediate-release (ROXICODONE) tablet 5 mg  5 mg    oxyCODONE immediate release (ROXICODONE) tablet 10 mg  10 mg    ondansetron (ZOFRAN) syringe/vial injection 4 mg  4 mg    Or    ondansetron (ZOFRAN ODT) dispertab 4 mg  4 mg    diphenhydrAMINE (BENADRYL) tablet/capsule 25 mg  25 mg    Or    diphenhydrAMINE (BENADRYL) injection 25 mg  25 mg    docusate sodium (COLACE) capsule 100 mg  100 mg    tetanus-dipth-acell pertussis (Tdap) inj 0.5 mL  0.5 mL    measles, mumps and rubella vaccine (MMR) injection 0.5 mL  0.5 mL    oxytocin (Pitocin) infusion (for post delivery)  125 mL/hr    oxytocin (PITOCIN) injection 10 Units  10 Units    prenatal plus vitamin (STUARTNATAL 1+1) 27-1 MG tablet 1 Tablet  1 Tablet    simethicone (Mylicon) chewable tablet 125 mg  125 mg    calcium carbonate (Tums) chewable tab 1,000 mg  1,000 mg       Exam:  Breast Exam: Inspection negative. No nipple discharge or bleeding. No masses or nodularity palpable  Abdomen: Abdomen soft, non-tender. BS normal. No masses,  No organomegaly  Fundus Non Tender: yes  Incision: dry and intact, healing well with good reapproximation, OMARI, staples in place  Perineum: perineum intact  Extremity: extremities, peripheral pulses and reflexes normal     Labs:  Recent Labs     05/13/23  0307 05/14/23  0247   WBC 23.2* 13.4*   RBC 4.13* 3.54*   HEMOGLOBIN 14.2 12.1   HEMATOCRIT 39.4 35.6*   MCV 95.4 100.6*   MCH 34.4* 34.2*   MCHC 36.0* 34.0   RDW 47.1 50.7*   PLATELETCT 159* 148*   MPV 10.5 10.2         Activity:   Discharge to home  Pelvic Rest x 6 weeks    Assessment:  normal postpartum course  BCM: desires natural family planning  Discharge Assessment: Taking adequate diet and fluids, no heavy bleeding or foul discharge, breasts without evidence of infection/concerns, voiding without difficulty     Follow up: [facility] on Friday 5/19 for incision check and suture removal; 5 weeks for postpartum visit      Discharge Meds:   Current Outpatient Medications   Medication Sig Dispense Refill    acetaminophen (TYLENOL) 500 MG Tab Take 2 Tablets by mouth every 6 hours. 30 Tablet 0    docusate sodium 100 MG Cap Take 100 mg by mouth 2 times a day as needed for Constipation. 60 Capsule 0    [START ON 5/17/2023] ibuprofen (MOTRIN) 800 MG Tab Take 1 Tablet by mouth every 8 hours as needed for Mild Pain or Moderate Pain. 30 Tablet 0    oxyCODONE immediate-release (ROXICODONE) 5 MG Tab Take 1 Tablet by mouth every four hours as needed for Severe Pain for up to 7 days. 20 Tablet 0       Melissa Stoll C.N.M.  Attending: Dr. Sanchez

## 2023-05-15 NOTE — LACTATION NOTE
This note was copied from a baby's chart.  Follow-up visit for breastfeeding assistance. Reviewed football and cross cradle positioning and nose to nipple angle of latch. Able to achieve deep and comfortable latch using cross cradle hold. Reviewed hand expression technique, drops easily removed.

## 2023-05-15 NOTE — PROGRESS NOTES
2100 Pt doing well bonding with baby, Assessment done fundus firm with scant lochia, abdomen soft non distended, wound open to air staples to skin clean and dry, Pt up to bathroom and voiding without difficulty, Scheduled medicine given, Encourage more ambulation, Needs attended.

## 2023-05-15 NOTE — CARE PLAN
Problem: Altered Physiologic Condition  Goal: Patient physiologically stable as evidenced by normal lochia, palpable uterine involution and vitals within normal limits  Outcome: Progressing     Problem: Bowel Elimination - Post Surgical  Goal: Patient will resume regular bowel sounds and function with no discomfort or distention  Outcome: Progressing   The patient is Stable - Low risk of patient condition declining or worsening pt is hemodynamically stable    Shift Goals: pain controled, breastfeed, rest  Clinical Goals: pain management  Patient Goals: pain controlled, breastfeed, rest  Family Goals: support    Progress made toward(s) clinical / shift goals:  pt verbalized acceptable level of pain    Patient is not progressing towards the following goals:

## 2023-05-15 NOTE — CARE PLAN
The patient is Stable - Low risk of patient condition declining or worsening    Shift Goals  Clinical Goals: VS WDL  Patient Goals: Sleep  Family Goals: support    Progress made toward(s) clinical / shift goals:    Problem: Pain - Standard  Goal: Alleviation of pain or a reduction in pain to the patient’s comfort goal  Outcome: Progressing     Problem: Knowledge Deficit - Postpartum  Goal: Patient will verbalize and demonstrate understanding of self and infant care  Outcome: Progressing     Problem: Psychosocial - Postpartum  Goal: Patient will verbalize and demonstrate effective bonding and parenting behavior  Outcome: Progressing     Problem: Altered Physiologic Condition  Goal: Patient physiologically stable as evidenced by normal lochia, palpable uterine involution and vitals within normal limits  Outcome: Progressing     Problem: Infection - Postpartum  Goal: Postpartum patient will be free of signs and symptoms of infection  Outcome: Progressing     Problem: Respiratory/Oxygenation Function Post-Surgical  Goal: Patient will achieve/maintain normal respiratory rate/effort  Outcome: Progressing     Problem: Bowel Elimination - Post Surgical  Goal: Patient will resume regular bowel sounds and function with no discomfort or distention  Outcome: Progressing     Problem: Early Mobilization - Post Surgery  Goal: Early mobilization post surgery  Outcome: Progressing       Patient is not progressing towards the following goals:

## 2023-05-15 NOTE — DISCHARGE INSTRUCTIONS

## 2023-05-19 ENCOUNTER — NON-PROVIDER VISIT (OUTPATIENT)
Dept: OBGYN | Facility: CLINIC | Age: 33
End: 2023-05-19
Payer: COMMERCIAL

## 2023-05-19 NOTE — PROGRESS NOTES
Pt here s/p 6 days emergency cesearean section, staple removal.    Pt was prepped with alcohol pad prior to removing staples.  30 staples removed with no complication.    Shanna Pride was the provider to saw pt and incision was great pt ok to go home.

## 2023-06-26 ENCOUNTER — POST PARTUM (OUTPATIENT)
Dept: OBGYN | Facility: CLINIC | Age: 33
End: 2023-06-26
Payer: COMMERCIAL

## 2023-06-26 VITALS — WEIGHT: 126.4 LBS | DIASTOLIC BLOOD PRESSURE: 82 MMHG | BODY MASS INDEX: 23.12 KG/M2 | SYSTOLIC BLOOD PRESSURE: 125 MMHG

## 2023-06-26 PROCEDURE — 3074F SYST BP LT 130 MM HG: CPT | Performed by: OBSTETRICS & GYNECOLOGY

## 2023-06-26 PROCEDURE — 3079F DIAST BP 80-89 MM HG: CPT | Performed by: OBSTETRICS & GYNECOLOGY

## 2023-06-26 PROCEDURE — 0503F POSTPARTUM CARE VISIT: CPT | Performed by: OBSTETRICS & GYNECOLOGY

## 2023-06-26 NOTE — PROGRESS NOTES
POST PARTUM VISIT NOTE    SUBJECTIVE:  Cherelle Strauss is a 32 y.o.  who presents for postpartum exam.   I have reviewed the prenatal and intrapartum course.     Date of delivery :  2023  Type of delivery: Emergency  section      ROS:  She feels well healed.   Laceration and/or Incision is well healed.   Bleeding lasted about 4-6 weeks.  She is getting along well with her partner.   She reports her mood is good. Denies postpartum blues.   She has not had sex.   Denies urinary or stool incontinence.    Last pap date: Reports pap smear was done at the beginning of her pregnancy with Dr. Kennedy      Delivery laceration(s): no  Feeding: Breast  Postpartum course: uncomplicated  Contraceptive plans: Declines     I have reviewed the patient's PMH for contraceptive risk factors, and she has no contraindications to contraception as planned.     OBJECTIVE:    Vitals:    23 1107   BP: 125/82       Appears well, vital signs normal.  Breast Exam: Left breast is erythematous from 4 to 7 o'clock. No masses palpated no tenderness   Abdomen: soft, nontender, well healing pfannenstiel incision   Pelvic Exam:  Perineum: perineum intact and well healed  Vulva: No external lesions, normal hair distribution, no adenopathy  Urethra: appears normal with no lesions    EPDS score:  8    ASSESSMENT:  normal postpartum course    PLAN:  RTO for annual exam due in 4 months  Contraception planned:  declines  She plans of following up with Dr. Kennedy for annual exams  Counseled on risks of short interval pregnancy  Continue prenatal vitamin while breastfeeding   Mastitis precautions were given regarding breast pruritis. Suspect the pruritis is due to skin stretching caused by overproduction of milk. Discussed returning if she develops a fever or notices a mass. We also discussed that if it doesn't resolve with moisturizer and a low dose OTC topical steroid cream, she should return for further evaluation     Cassy  VIKTOR Tam M.D.

## 2024-02-02 ENCOUNTER — HOSPITAL ENCOUNTER (OUTPATIENT)
Dept: LAB | Facility: MEDICAL CENTER | Age: 34
End: 2024-02-02
Payer: COMMERCIAL

## 2024-02-02 LAB
25(OH)D3 SERPL-MCNC: 34 NG/ML (ref 30–100)
ALBUMIN SERPL BCP-MCNC: 4.8 G/DL (ref 3.2–4.9)
ALBUMIN/GLOB SERPL: 1.5 G/DL
ALP SERPL-CCNC: 93 U/L (ref 30–99)
ALT SERPL-CCNC: 15 U/L (ref 2–50)
ANION GAP SERPL CALC-SCNC: 11 MMOL/L (ref 7–16)
AST SERPL-CCNC: 12 U/L (ref 12–45)
BASOPHILS # BLD AUTO: 0.6 % (ref 0–1.8)
BASOPHILS # BLD: 0.03 K/UL (ref 0–0.12)
BILIRUB SERPL-MCNC: 0.2 MG/DL (ref 0.1–1.5)
BUN SERPL-MCNC: 14 MG/DL (ref 8–22)
CALCIUM ALBUM COR SERPL-MCNC: 9.1 MG/DL (ref 8.5–10.5)
CALCIUM SERPL-MCNC: 9.7 MG/DL (ref 8.5–10.5)
CHLORIDE SERPL-SCNC: 105 MMOL/L (ref 96–112)
CHOLEST SERPL-MCNC: 175 MG/DL (ref 100–199)
CO2 SERPL-SCNC: 24 MMOL/L (ref 20–33)
CREAT SERPL-MCNC: 0.63 MG/DL (ref 0.5–1.4)
CRP SERPL HS-MCNC: 0.4 MG/L (ref 0–3)
DHEA-S SERPL-MCNC: 148 UG/DL (ref 98.8–340)
EOSINOPHIL # BLD AUTO: 0.08 K/UL (ref 0–0.51)
EOSINOPHIL NFR BLD: 1.5 % (ref 0–6.9)
ERYTHROCYTE [DISTWIDTH] IN BLOOD BY AUTOMATED COUNT: 43.8 FL (ref 35.9–50)
EST. AVERAGE GLUCOSE BLD GHB EST-MCNC: 100 MG/DL
ESTRADIOL SERPL-MCNC: 46.2 PG/ML
FERRITIN SERPL-MCNC: 64.5 NG/ML (ref 10–291)
FOLATE SERPL-MCNC: 23.9 NG/ML
GFR SERPLBLD CREATININE-BSD FMLA CKD-EPI: 121 ML/MIN/1.73 M 2
GLOBULIN SER CALC-MCNC: 3.1 G/DL (ref 1.9–3.5)
GLUCOSE SERPL-MCNC: 78 MG/DL (ref 65–99)
HBA1C MFR BLD: 5.1 % (ref 4–5.6)
HCT VFR BLD AUTO: 42.7 % (ref 37–47)
HCYS SERPL-SCNC: 8.15 UMOL/L
HDLC SERPL-MCNC: 56 MG/DL
HGB BLD-MCNC: 14.6 G/DL (ref 12–16)
IMM GRANULOCYTES # BLD AUTO: 0.01 K/UL (ref 0–0.11)
IMM GRANULOCYTES NFR BLD AUTO: 0.2 % (ref 0–0.9)
IRON SATN MFR SERPL: 31 % (ref 15–55)
IRON SERPL-MCNC: 107 UG/DL (ref 40–170)
LDLC SERPL CALC-MCNC: 106 MG/DL
LYMPHOCYTES # BLD AUTO: 2.33 K/UL (ref 1–4.8)
LYMPHOCYTES NFR BLD: 42.8 % (ref 22–41)
MAGNESIUM SERPL-MCNC: 2.1 MG/DL (ref 1.5–2.5)
MCH RBC QN AUTO: 32.4 PG (ref 27–33)
MCHC RBC AUTO-ENTMCNC: 34.2 G/DL (ref 32.2–35.5)
MCV RBC AUTO: 94.7 FL (ref 81.4–97.8)
MONOCYTES # BLD AUTO: 0.32 K/UL (ref 0–0.85)
MONOCYTES NFR BLD AUTO: 5.9 % (ref 0–13.4)
NEUTROPHILS # BLD AUTO: 2.68 K/UL (ref 1.82–7.42)
NEUTROPHILS NFR BLD: 49 % (ref 44–72)
NRBC # BLD AUTO: 0 K/UL
NRBC BLD-RTO: 0 /100 WBC (ref 0–0.2)
PLATELET # BLD AUTO: 238 K/UL (ref 164–446)
PMV BLD AUTO: 10.1 FL (ref 9–12.9)
POTASSIUM SERPL-SCNC: 4.4 MMOL/L (ref 3.6–5.5)
PROGEST SERPL-MCNC: 0.35 NG/ML
PROLACTIN SERPL-MCNC: 12.8 NG/ML (ref 2.8–26)
PROT SERPL-MCNC: 7.9 G/DL (ref 6–8.2)
RBC # BLD AUTO: 4.51 M/UL (ref 4.2–5.4)
SODIUM SERPL-SCNC: 140 MMOL/L (ref 135–145)
T3 SERPL-MCNC: 103 NG/DL (ref 60–181)
T3FREE SERPL-MCNC: 2.92 PG/ML (ref 2–4.4)
T4 FREE SERPL-MCNC: 1.18 NG/DL (ref 0.93–1.7)
T4 SERPL-MCNC: 6.4 UG/DL (ref 4–12)
TESTOST SERPL-MCNC: 27 NG/DL (ref 9–75)
TIBC SERPL-MCNC: 348 UG/DL (ref 250–450)
TRIGL SERPL-MCNC: 64 MG/DL (ref 0–149)
TSH SERPL DL<=0.005 MIU/L-ACNC: 0.97 UIU/ML (ref 0.38–5.33)
UIBC SERPL-MCNC: 241 UG/DL (ref 110–370)
VIT B12 SERPL-MCNC: 633 PG/ML (ref 211–911)
WBC # BLD AUTO: 5.5 K/UL (ref 4.8–10.8)

## 2024-02-02 PROCEDURE — 84479 ASSAY OF THYROID (T3 OR T4): CPT

## 2024-02-02 PROCEDURE — 83036 HEMOGLOBIN GLYCOSYLATED A1C: CPT

## 2024-02-02 PROCEDURE — 84482 T3 REVERSE: CPT

## 2024-02-02 PROCEDURE — 85025 COMPLETE CBC W/AUTO DIFF WBC: CPT

## 2024-02-02 PROCEDURE — 82670 ASSAY OF TOTAL ESTRADIOL: CPT

## 2024-02-02 PROCEDURE — 83527 ASSAY OF INSULIN: CPT

## 2024-02-02 PROCEDURE — 83735 ASSAY OF MAGNESIUM: CPT

## 2024-02-02 PROCEDURE — 84270 ASSAY OF SEX HORMONE GLOBUL: CPT

## 2024-02-02 PROCEDURE — 83550 IRON BINDING TEST: CPT

## 2024-02-02 PROCEDURE — 83525 ASSAY OF INSULIN: CPT

## 2024-02-02 PROCEDURE — 82728 ASSAY OF FERRITIN: CPT

## 2024-02-02 PROCEDURE — 84443 ASSAY THYROID STIM HORMONE: CPT

## 2024-02-02 PROCEDURE — 80053 COMPREHEN METABOLIC PANEL: CPT

## 2024-02-02 PROCEDURE — 84403 ASSAY OF TOTAL TESTOSTERONE: CPT

## 2024-02-02 PROCEDURE — 84481 FREE ASSAY (FT-3): CPT

## 2024-02-02 PROCEDURE — 83540 ASSAY OF IRON: CPT

## 2024-02-02 PROCEDURE — 84144 ASSAY OF PROGESTERONE: CPT

## 2024-02-02 PROCEDURE — 86800 THYROGLOBULIN ANTIBODY: CPT

## 2024-02-02 PROCEDURE — 86141 C-REACTIVE PROTEIN HS: CPT

## 2024-02-02 PROCEDURE — 83090 ASSAY OF HOMOCYSTEINE: CPT

## 2024-02-02 PROCEDURE — 80061 LIPID PANEL: CPT

## 2024-02-02 PROCEDURE — 84146 ASSAY OF PROLACTIN: CPT

## 2024-02-02 PROCEDURE — 82746 ASSAY OF FOLIC ACID SERUM: CPT

## 2024-02-02 PROCEDURE — 82627 DEHYDROEPIANDROSTERONE: CPT

## 2024-02-02 PROCEDURE — 82607 VITAMIN B-12: CPT

## 2024-02-02 PROCEDURE — 36415 COLL VENOUS BLD VENIPUNCTURE: CPT

## 2024-02-02 PROCEDURE — 84439 ASSAY OF FREE THYROXINE: CPT

## 2024-02-02 PROCEDURE — 82306 VITAMIN D 25 HYDROXY: CPT

## 2024-02-02 PROCEDURE — 84480 ASSAY TRIIODOTHYRONINE (T3): CPT

## 2024-02-04 LAB
T UPTAKE NL11712: 1.1 TBI (ref 0.8–1.3)
THYROGLOB AB SERPL-ACNC: <0.9 IU/ML (ref 0–4)

## 2024-02-05 LAB
MISCELLANEOUS LAB RESULT MISCLAB: NORMAL
SHBG SERPL-SCNC: 61 NMOL/L (ref 25–122)

## 2024-02-07 LAB — T3REVERSE SERPL-MCNC: 10.8 NG/DL (ref 9–27)

## (undated) DEVICE — PAD LAP STERILE 18 X 18 - (5/PK 40PK/CA)

## (undated) DEVICE — ELECTRODE DUAL RETURN W/ CORD - (50/PK)

## (undated) DEVICE — WATER IRRIGATION STERILE 1000ML (12EA/CA)

## (undated) DEVICE — TRAY SPINAL ANESTHESIA NON-SAFETY (10/CA)

## (undated) DEVICE — DRESSING NON-ADHERING 8 X 3 - (50/BX)

## (undated) DEVICE — PACK ROOM TURNOVER L&D (12/CA)

## (undated) DEVICE — CATHETER IV NON-SAFETY 18 GA X 1 1/4 (50/BX 4BX/CA)

## (undated) DEVICE — SODIUM CHL IRRIGATION 0.9% 1000ML (12EA/CA)

## (undated) DEVICE — SET EXTENSION WITH 2 PORTS (48EA/CA) ***PART #2C8610 IS A SUBSTITUTE*****

## (undated) DEVICE — TAPE CLOTH MEDIPORE 6 INCH - (12RL/CA)

## (undated) DEVICE — GLOVE, BIOGEL ECLIPSE, SZ 7.0, PF LTX (50/BX)

## (undated) DEVICE — BAG SPONGE COUNT 10.25 X 32 - BLUE (250/CA)

## (undated) DEVICE — SUTURE 0 VICRYL PLUS CT-1 - 36 INCH (36/BX)

## (undated) DEVICE — STAPLER SKIN DISP - (6/BX 10BX/CA) VISISTAT

## (undated) DEVICE — SOLUTION PLASMA-LYTE PH 7.4 INJ 1000ML  (14EA/CA)

## (undated) DEVICE — HEAD HOLDER JUNIOR/ADULT

## (undated) DEVICE — SPONGE GAUZESTER 4 X 4 4PLY - (128PK/CA)

## (undated) DEVICE — TUBING CLEARLINK DUO-VENT - C-FLO (48EA/CA)

## (undated) DEVICE — PACK C-SECTION (2EA/CA)

## (undated) DEVICE — PENCIL ELECTSURG 10FT HLSTR - WITH BLADE (50EA/CA)

## (undated) DEVICE — GLOVE BIOGEL SZ 7 SURGICAL PF LTX - (50PR/BX 4BX/CA)

## (undated) DEVICE — KIT  I.V. START (100EA/CA)